# Patient Record
Sex: FEMALE | Race: WHITE | Employment: FULL TIME | URBAN - METROPOLITAN AREA
[De-identification: names, ages, dates, MRNs, and addresses within clinical notes are randomized per-mention and may not be internally consistent; named-entity substitution may affect disease eponyms.]

---

## 2023-08-07 ENCOUNTER — HOSPITAL ENCOUNTER (OUTPATIENT)
Age: 48
Discharge: HOME | End: 2023-08-07
Payer: COMMERCIAL

## 2023-08-07 VITALS
HEART RATE: 95 BPM | OXYGEN SATURATION: 99 % | DIASTOLIC BLOOD PRESSURE: 78 MMHG | SYSTOLIC BLOOD PRESSURE: 115 MMHG | RESPIRATION RATE: 16 BRPM | TEMPERATURE: 97 F

## 2023-08-07 DIAGNOSIS — L03.119 CELLULITIS OF ANTERIOR LOWER LEG: Primary | ICD-10-CM

## 2023-08-07 PROCEDURE — 87070 CULTURE OTHR SPECIMN AEROBIC: CPT | Performed by: PHYSICIAN ASSISTANT

## 2023-08-07 PROCEDURE — 99203 OFFICE O/P NEW LOW 30 MIN: CPT

## 2023-08-07 RX ORDER — CIPROFLOXACIN 500 MG/1
500 TABLET, FILM COATED ORAL 2 TIMES DAILY
Qty: 14 TABLET | Refills: 0 | Status: SHIPPED | OUTPATIENT
Start: 2023-08-07 | End: 2023-08-14

## 2023-08-08 NOTE — ED PROVIDER NOTES
Patient presents with: Insect Bite      HPI:     Katty Barajas is a 51year old female who presents for evaluation of left lower leg redness and swelling over the last 4 days. Patient states pain developed after concern of insect bite at onset. Notes puncture wound noted with progressive redness over the last 2 days. Notes drainage yesterday purulent. Denies history of complicated cellulitis or MRSA. Denies documented fever antipyretic use. Using homeopathic agents and topical creams for support. Patient states unable to take any oral antibiotic aside from Cipro based on significant allergy history. Denies associated headache dizziness cough congestion sore throat neck pain chest pain shortness of breath abdominal pain back pain upper EXTR numbness or weakness. [facility] asessment screens reviewed and agree. Nurses notes reviewed I agree with documentation. No family history on file. Family history reviewed with patient/caregiver and is not pertinent to presenting problem. Social History    Socioeconomic History      Marital status:       Spouse name: Not on file      Number of children: Not on file      Years of education: Not on file      Highest education level: Not on file    Occupational History      Not on file    Tobacco Use      Smoking status: Never      Smokeless tobacco: Never    Substance and Sexual Activity      Alcohol use: Not on file      Drug use: Not on file      Sexual activity: Not on file    Other Topics      Concerns:        Not on file    Social History Narrative      Not on file    Social Determinants of Health  Financial Resource Strain: Not on file  Food Insecurity: Not on file  Transportation Needs: Not on file  Physical Activity: Not on file  Stress: Not on file  Social Connections: Not on file  Housing Stability: Not on file      ROS:   Positive for stated complaint: leg pain  All other systems reviewed and negative except as noted above.   Constitutional and Vital Signs Reviewed. Physical Exam:     Findings: There were no vitals taken for this visit. GENERAL: well developed, well nourished, well hydrated, no distress  SKIN: good skin turgor, no obvious rashes  NECK: supple, no adenopathy  EXTREMITIES: Superficial puncture 0.25 cm along the left lower lateral shin. Surrounding erythema dimensions 7 cm x 6 cm. Normal Soni test.    No cyanosis or edema. SOLOMON without difficulty  HEAD: normocephalic, atraumatic  EYES: sclera non icteric bilateral, conjunctiva clear  NOSE: nasal turbinates: pink, normal mucosa  NEURO: No focal deficits  PSYCH: Alert and oriented x3. Answering questions appropriately. Mood appropriate. MDM/Assessment/Plan:   Orders for this encounter:    Orders Placed This Encounter      Aerobic Bacterial Culture          Order Specific Question: Spec desc: Answer: Wound [[id number]]          Order Specific Question: Release to patient          Answer: Immediate      ciprofloxacin 500 MG Oral Tab          Sig: Take 1 tablet (500 mg total) by mouth 2 (two) times daily for 7 days. Dispense:  14 tablet          Refill:  0      Labs performed this visit:  No results found for this or any previous visit (from the past 10 hour(s)). MDM:  Patient erythema marked. Patient agrees with oral antibiotics in form of Cipro based on patient's refusal of any alternative oral analgesics based on nonspecific allergy history provided. Cx sent based on hx to assure no further management including IV medication warranted. Patient requesting her homeopathic topical cream versus bacitracin or bandage. Patient agrees for 2-day wound follow-up with her PCP. Well's Criteria for DVT wnl not warranting US. Diagnosis:    ICD-10-CM    1. Cellulitis of anterior lower leg  L03.119           All results reviewed and discussed with patient. See AVS for detailed discharge instructions for your condition today.     Follow Up with:  Robyn Shrestha MD [place]. [facility]  [phone]    Schedule an appointment as soon as possible for a visit in 2 days  WOUND CHECK IF UNABLE TO SEE YOUR DOCTOR.

## 2023-08-08 NOTE — ED INITIAL ASSESSMENT (HPI)
Pt presents with \"insect bite to outer left ankle\", pt reports redness and swelling. Pt has significant pitting edema to BLE. 3+ edeme.      No SOB, no fever

## 2023-12-21 ENCOUNTER — HOSPITAL ENCOUNTER (OUTPATIENT)
Age: 48
Discharge: HOME | End: 2023-12-21
Attending: EMERGENCY MEDICINE
Payer: COMMERCIAL

## 2023-12-21 VITALS
RESPIRATION RATE: 18 BRPM | TEMPERATURE: 98 F | DIASTOLIC BLOOD PRESSURE: 68 MMHG | SYSTOLIC BLOOD PRESSURE: 128 MMHG | OXYGEN SATURATION: 100 % | HEART RATE: 72 BPM

## 2023-12-21 DIAGNOSIS — H61.21 IMPACTED CERUMEN OF RIGHT EAR: Primary | ICD-10-CM

## 2023-12-21 PROCEDURE — 99213 OFFICE O/P EST LOW 20 MIN: CPT

## 2023-12-21 PROCEDURE — 99212 OFFICE O/P EST SF 10 MIN: CPT
